# Patient Record
Sex: FEMALE | Employment: UNEMPLOYED | URBAN - METROPOLITAN AREA
[De-identification: names, ages, dates, MRNs, and addresses within clinical notes are randomized per-mention and may not be internally consistent; named-entity substitution may affect disease eponyms.]

---

## 2020-07-02 DIAGNOSIS — O09.92 HIGH-RISK PREGNANCY IN SECOND TRIMESTER: Primary | ICD-10-CM

## 2020-07-06 ENCOUNTER — INITIAL CONSULT (OUTPATIENT)
Dept: MATERNAL FETAL MEDICINE | Facility: CLINIC | Age: 35
End: 2020-07-06
Payer: OTHER GOVERNMENT

## 2020-07-06 VITALS
BODY MASS INDEX: 40.37 KG/M2 | DIASTOLIC BLOOD PRESSURE: 86 MMHG | OXYGEN SATURATION: 98 % | WEIGHT: 282 LBS | RESPIRATION RATE: 20 BRPM | HEIGHT: 70 IN | SYSTOLIC BLOOD PRESSURE: 140 MMHG | HEART RATE: 98 BPM

## 2020-07-06 DIAGNOSIS — O09.92 HIGH-RISK PREGNANCY IN SECOND TRIMESTER: ICD-10-CM

## 2020-07-06 PROCEDURE — 99204 PR OFFICE/OUTPT VISIT, NEW, LEVL IV, 45-59 MIN: ICD-10-PCS | Mod: 25,S$GLB,, | Performed by: OBSTETRICS & GYNECOLOGY

## 2020-07-06 PROCEDURE — 76811 OB US DETAILED SNGL FETUS: CPT | Mod: S$GLB,,, | Performed by: OBSTETRICS & GYNECOLOGY

## 2020-07-06 PROCEDURE — 76811 PR US, OB FETAL EVAL & EXAM, TRANSABDOM,FIRST GESTATION: ICD-10-PCS | Mod: S$GLB,,, | Performed by: OBSTETRICS & GYNECOLOGY

## 2020-07-06 PROCEDURE — 99204 OFFICE O/P NEW MOD 45 MIN: CPT | Mod: 25,S$GLB,, | Performed by: OBSTETRICS & GYNECOLOGY

## 2020-07-06 RX ORDER — PANTOPRAZOLE SODIUM 40 MG/1
TABLET, DELAYED RELEASE ORAL
COMMUNITY
Start: 2020-05-27

## 2020-07-06 RX ORDER — ACETAMINOPHEN AND CODEINE PHOSPHATE 300; 30 MG/1; MG/1
TABLET ORAL
COMMUNITY
Start: 2020-06-29

## 2020-07-06 RX ORDER — LEVOTHYROXINE SODIUM 112 UG/1
224 TABLET ORAL DAILY
COMMUNITY
Start: 2020-06-29 | End: 2020-10-15 | Stop reason: DRUGHIGH

## 2020-07-06 NOTE — LETTER
July 6, 2020        Orlin Sanon MD  [address] [facility] [address]             [place] [facility]  [address]  [place] [address]  Phone: [phone]  Fax: [phone]   Patient: Louisa Barrios   MR Number: [id number]   YOB: 1985   Date of Visit: 7/6/2020       Dear Dr. Sanon:    Thank you for referring Louisa Barrios to me for evaluation. Attached you will find relevant portions of my assessment and plan of care.    If you have questions, please do not hesitate to call me. I look forward to following Louisa Barrios along with you.    Sincerely,      Delta Daugherty MD      CC  No Recipients    Enclosure

## 2020-07-06 NOTE — PROGRESS NOTES
"Louisa is here for initial MFM consultation, referred by Dr. Talita Jr for BMI 49 and history of thyroid cancer, surgery and radiation.    She is not feeling fetal movement.    Louisa denies vaginal bleeding, loss of fluid, recurrent cramping, but does complain of occasional sharp pain across lower abdomen lasting for a few hours.  She also has occasional migraine headaches since becoming pregnant.      Vitals:    07/06/20 1035   BP: (!) 140/86   Pulse: 98   Resp: 20   SpO2: 98%   Weight: 127.9 kg (282 lb)   Height: 5' 10" (1.778 m)      BMI:                    40.46 kg/m^2               "
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
## 2020-07-28 DIAGNOSIS — O09.899 SINGLE UMBILICAL ARTERY AFFECTING MANAGEMENT OF MOTHER IN SINGLETON PREGNANCY, ANTEPARTUM: Primary | ICD-10-CM

## 2020-08-03 ENCOUNTER — PROCEDURE VISIT (OUTPATIENT)
Dept: MATERNAL FETAL MEDICINE | Facility: CLINIC | Age: 35
End: 2020-08-03
Payer: OTHER GOVERNMENT

## 2020-08-03 VITALS
HEART RATE: 90 BPM | BODY MASS INDEX: 41.95 KG/M2 | RESPIRATION RATE: 20 BRPM | SYSTOLIC BLOOD PRESSURE: 142 MMHG | OXYGEN SATURATION: 98 % | WEIGHT: 293 LBS | DIASTOLIC BLOOD PRESSURE: 86 MMHG | HEIGHT: 70 IN

## 2020-08-03 DIAGNOSIS — O09.899 SINGLE UMBILICAL ARTERY AFFECTING MANAGEMENT OF MOTHER IN SINGLETON PREGNANCY, ANTEPARTUM: ICD-10-CM

## 2020-08-03 PROCEDURE — 99215 PR OFFICE/OUTPT VISIT, EST, LEVL V, 40-54 MIN: ICD-10-PCS | Mod: 95,25,S$GLB, | Performed by: OBSTETRICS & GYNECOLOGY

## 2020-08-03 PROCEDURE — 99215 OFFICE O/P EST HI 40 MIN: CPT | Mod: 95,25,S$GLB, | Performed by: OBSTETRICS & GYNECOLOGY

## 2020-08-03 PROCEDURE — 76816 PR  US,PREGNANT UTERUS,F/U,TRANSABD APP: ICD-10-PCS | Mod: S$GLB,,, | Performed by: OBSTETRICS & GYNECOLOGY

## 2020-08-03 PROCEDURE — 76816 OB US FOLLOW-UP PER FETUS: CPT | Mod: S$GLB,,, | Performed by: OBSTETRICS & GYNECOLOGY

## 2020-08-03 NOTE — PROGRESS NOTES
Indication for follow up consultation:  History of hypothyroidism secondary to iatrogenic therapy/thyroid cancer  Maternal obesity  Fetus with single umbilical artery  New finding of gestational hypertension    Provider requesting consultation:  Dr. Talita Al    Dear Dr. Talita Al    I had the pleasure of seeing Louisa Barrios for follow up consultation today via telemedicine.  As you recall she is a 34 y.o.  at 25w1d here for follow up consultation regarding hypothyroidism and fetal single umbilical artery.    Please see my prior consultation note for complete history specifics.  In short the patient has a history of hypothyroidism secondary to therapy for thyroid cancer.  She currently has checking levothyroxine each day and has her TSH checked once per month.  According to the patient her thyroid function is doing well.    Also at our last visit the patient was noted to have a fetus with a single umbilical artery.  This appeared to be isolated.    Lastly since our last visit the patient has had some mild range hypertension at your office visits.    Review of systems: The patient denies any vaginal bleeding, loss of fluid or contraction pain today.  Vitals:    20 1014   BP: (!) 142/86   Pulse: 90   Resp: 20       Physical exam:  Gen: WD obese in NAD  HEENT: WNL  Abdomen: Soft, non-tender  Skin: No rash or jaundice  Extremities: No clubbing or cyanosis  Neuro: Grossly intact    Ultrasound:  A repeat detailed fetal anatomical survey was completed once again today.  There is a single intrauterine pregnancy in the cephalic presentation.  The estimated fetal weight is 698 grams which is the 29th percentile for this gestational age. The fetus once again is noted to have a single umbilical artery.  This appears to be isolated however the ultrasound is somewhat limited secondary to maternal obesity.  The amniotic fluid volume appears to be normal. The placenta does not show any evidence of previa.  Please see our  official report for further specifics.    Recommendations:  Today I reviewed with the patient the management of hypothyroidism in pregnancy.  I recommend to try to keep her TSH in the lower 3rd of normal range.  I would suggest checking her TSH at least once every trimester and adjusting her medications accordingly.  If her medication adjustment is needed then I would suggest repeating a TSH level in approximately 4 weeks.    We also reviewed the finding of a single umbilical artery.  Once again this appears to be isolated with no evidence of other defects.  I did  the patient that her body habitus makes her ultrasound fairly difficult yet to fortunately there does not appear to be any abnormality seen.  I would like for the patient to return here again in 1 month to reassess fetal growth and well-being.    Lastly we discussed with the patient the new finding of gestational hypertension.  I counseled the patient that I would recommend to try to keep her systolic blood pressure less than 160 and her diastolic blood pressure less than 110 mmHg.  I would not start medications at this mild range hypertension as there is the possibility of decreasing uterine perfusion and affecting fetal growth and well-being.  I counseled the patient that she may benefit from a daily low-dose aspirin and have suggested that she start aspirin 81 mg once per day.    From a follow-up standpoint, let us plan on the patient return here again in 1 month to reassess fetal growth    Thanks once again for allowing us to participate in the care of your patients.  If you have any questions about today's consultation feel free to contact me or my partners at [phone].    Sincerely,

## 2020-08-03 NOTE — PROGRESS NOTES
"Louisa is here for followup M consultation for hx of thyroid CA treated, referred by Dr. Talita Jr.    She is feeling fetal movement.    Louisa denies vaginal bleeding, loss of fluid, recurrent contractions.    She is still on Levothryroxine 224 mcg daily.    Of note her weight at her last MFM visit was recorded incorrectly as 282# when it was truly 342# She has consistently lost weight since began prenatal care at 351#.    Vitals:    07/06/20 1016 08/03/20 1014   BP:  (!) 142/86   Pulse:  90   Resp:  20   SpO2:  98%   Weight: (!) 155.1 kg (342 lb) (!) 153.3 kg (338 lb)   Height:  5' 10" (1.778 m)     BMI:                    48.5 kg/m^2     "

## 2020-08-03 NOTE — LETTER
August 3, 2020        Orlin Sanon MD  [address] [facility] [address]             [place] - [facility]etal [facility]  [address]  [place] [address]  Phone: [phone]  Fax: [phone]   Patient: Louisa Barrios   MR Number: [id number]   YOB: 1985   Date of Visit: 8/3/2020       Dear Dr. Sanon:    Thank you for referring Louisa Barrios to me for evaluation. Below are the relevant portions of my assessment and plan of care.            If you have questions, please do not hesitate to call me. I look forward to following Louisa along with you.    Sincerely,      Delta Daugherty MD      CC  No Recipients

## 2020-08-24 DIAGNOSIS — O09.899 SINGLE UMBILICAL ARTERY AFFECTING MANAGEMENT OF MOTHER IN SINGLETON PREGNANCY, ANTEPARTUM: Primary | ICD-10-CM

## 2020-08-31 ENCOUNTER — TELEPHONE (OUTPATIENT)
Dept: MATERNAL FETAL MEDICINE | Facility: CLINIC | Age: 35
End: 2020-08-31

## 2020-08-31 NOTE — TELEPHONE ENCOUNTER
Dr. Daugherty reviewed chart and recommends repeat growth US within 1-2 weeks.    Louisa was contacted and encouraged to seek care in [place] for growth US within 1-2 weeks. If unable to see a provider, go to hospital with OB services and explain need for care. She plans to go back to [facility] in about 2 weeks if possible.
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
## 2020-09-10 ENCOUNTER — TELEPHONE (OUTPATIENT)
Dept: MATERNAL FETAL MEDICINE | Facility: CLINIC | Age: 35
End: 2020-09-10

## 2020-09-10 ENCOUNTER — PROCEDURE VISIT (OUTPATIENT)
Dept: MATERNAL FETAL MEDICINE | Facility: CLINIC | Age: 35
End: 2020-09-10
Payer: OTHER GOVERNMENT

## 2020-09-10 VITALS
RESPIRATION RATE: 20 BRPM | OXYGEN SATURATION: 98 % | BODY MASS INDEX: 41.95 KG/M2 | DIASTOLIC BLOOD PRESSURE: 90 MMHG | SYSTOLIC BLOOD PRESSURE: 146 MMHG | WEIGHT: 293 LBS | HEIGHT: 70 IN | HEART RATE: 108 BPM

## 2020-09-10 DIAGNOSIS — O09.899 SINGLE UMBILICAL ARTERY AFFECTING MANAGEMENT OF MOTHER IN SINGLETON PREGNANCY, ANTEPARTUM: ICD-10-CM

## 2020-09-10 PROCEDURE — 76819 FETAL BIOPHYS PROFIL W/O NST: CPT | Mod: S$GLB,,, | Performed by: OBSTETRICS & GYNECOLOGY

## 2020-09-10 PROCEDURE — 99214 PR OFFICE/OUTPT VISIT, EST, LEVL IV, 30-39 MIN: ICD-10-PCS | Mod: 95,25,S$GLB, | Performed by: OBSTETRICS & GYNECOLOGY

## 2020-09-10 PROCEDURE — 76816 OB US FOLLOW-UP PER FETUS: CPT | Mod: S$GLB,,, | Performed by: OBSTETRICS & GYNECOLOGY

## 2020-09-10 PROCEDURE — 99214 OFFICE O/P EST MOD 30 MIN: CPT | Mod: 95,25,S$GLB, | Performed by: OBSTETRICS & GYNECOLOGY

## 2020-09-10 PROCEDURE — 76816 PR  US,PREGNANT UTERUS,F/U,TRANSABD APP: ICD-10-PCS | Mod: S$GLB,,, | Performed by: OBSTETRICS & GYNECOLOGY

## 2020-09-10 PROCEDURE — 76819 PR US, OB, FETAL BIOPHYSICAL, W/O NST: ICD-10-PCS | Mod: S$GLB,,, | Performed by: OBSTETRICS & GYNECOLOGY

## 2020-09-10 NOTE — LETTER
September 10, 2020        Orlin Sanon MD  [address] [facility] [address]             [place] - Maternal Fetal [facility]  [address]  [place] [address]  Phone: [phone]  Fax: [phone]   Patient: Louisa Barrios   MR Number: [id number]   YOB: 1985   Date of Visit: 9/10/2020       Dear Dr. Sanon:    Thank you for referring Louisa Barrios to me for evaluation. Below are the relevant portions of my assessment and plan of care.            If you have questions, please do not hesitate to call me. I look forward to following Louisa along with you.    Sincerely,      Delta Daugherty MD      [facility]

## 2020-09-10 NOTE — PROGRESS NOTES
Indication for follow up consultation:  Iatrogenic hypothyroidism secondary to history of thyroid cancer  Gestational hypertension  Fetus with a single umbilical artery    Provider requesting consultation: Dr. Sanon     Dear Dr. Sanon    I had the pleasure of seeing Louisa Barrios for follow up consultation today via telemedicine.  As you recall she is a 34 y.o.  at 30w4d here for follow up consultation regarding Iatrogenic hypothyroidism secondary to history of thyroid cancer, gestational hypertension and fetus with a single umbilical artery.    In regards to hypothyroidism, the patient is followed closely by Endocrinology who is managing her thyroid replacement medications at this time.    In regards to gestational hypertension, the patient has been noted thus far to only have mild range hypertension with no other evidence of severe disease.    Review of systems: The patient denies any vaginal bleeding, loss of fluid or contraction pain today.  She does complain of swelling of her right foot and ankle.  This swelling appears to be isolated to just her ankle and foot and does not include her calf or thigh.  Vitals:    09/10/20 1447   BP: (!) 146/90   Pulse: 108   Resp: 20   .Weight:350    Physical exam:  Gen: WDobese s in NAD  HEENT: WNL  Abdomen: Soft, non-tender on u/s exam  Skin: No rash or jaundice  Extremities: No clubbing or cyanosis  Neuro: Grossly intact    Ultrasound:  A repeat detailed fetal anatomical survey was completed once again today.  There is a single intrauterine pregnancy in the cephalic presentation.  The estimated fetal weight is 1615 grams which is the 35th percentile for this gestational age. The fetus was once again noted to have an isolated single umbilical artery.  The amniotic fluid volume appears to be normal. The placenta does not show any evidence of previa.  Please see our official report for further specifics.    Recommendations:  In regards to hypothyroidism, would defer my  recommendations to her endocrinologist regarding the management of her thyroid disease in pregnancy.  That being said well-controlled hypothyroidism is not an indication for early delivery.    This I also discussed with the patient the management of hypertension in pregnancy.  As long as her blood pressure is less than 160 mmHg systolic and less than 110 mmhg blood pressure diastolic then I do not see any indication for now for antihypertensive medication.  I would however recommend to start some form of  testing beginning at 32 weeks and continuing until delivery.  I would also consider moving towards delivery at 37-39 weeks as long as she does not show any evidence of severe disease prior to that time.    In regards to the finding of a single umbilical artery, once again this continues to appear isolated.  I would like for her to return here for 1 final assessment of fetal growth in approximately 4-5 weeks.  As long as the interval fetal growth is reassuring then an isolated single umbilical artery is not an indication for an early delivery.    Thanks once again for allowing us to participate in the care of your patients.  If you have any questions about today's consultation feel free to contact me or my partners at [phone].    Sincerely,

## 2020-09-10 NOTE — PROGRESS NOTES
"Louisa is here for followup [facility] consultation for 2 vessel cord, gestational HTN, and hx of thyroid cancer referred by Dr. Talita Jr and BETTY.    She is feeling fetal movement.    Louisa denies vaginal bleeding, loss of fluid, recurrent contractions, headaches, visual, or epigastric complaints. She dose have +2 edema in both feet and states that the R one gets worse and painful.    Her Levothyroxine was increased to 250 mcg daily by her endocrinologist; he thinks her thyroid cancer may be back.    She is still smoking 2-3 cigarettes per day.    Vitals:    09/10/20 1447   BP: (!) 146/90   Pulse: 108   Resp: 20   SpO2: 98%   Weight: (!) 158.8 kg (350 lb)   Height: 5' 10" (1.778 m)     BMI:                    50.22 kg/m^2   "

## 2020-09-10 NOTE — TELEPHONE ENCOUNTER
Pt states BETTY won't do her growth scan as Dr. Daugherty requested on 8/31/20; states she has tried 2 times. Pt is able to drive to [place] today and will come for [facility] by 1415.

## 2020-09-28 DIAGNOSIS — O09.899 SINGLE UMBILICAL ARTERY AFFECTING MANAGEMENT OF MOTHER IN SINGLETON PREGNANCY, ANTEPARTUM: Primary | ICD-10-CM

## 2020-10-15 ENCOUNTER — PROCEDURE VISIT (OUTPATIENT)
Dept: MATERNAL FETAL MEDICINE | Facility: CLINIC | Age: 35
End: 2020-10-15
Payer: OTHER GOVERNMENT

## 2020-10-15 VITALS
HEART RATE: 105 BPM | OXYGEN SATURATION: 98 % | WEIGHT: 293 LBS | HEIGHT: 70 IN | RESPIRATION RATE: 20 BRPM | SYSTOLIC BLOOD PRESSURE: 136 MMHG | BODY MASS INDEX: 41.95 KG/M2 | DIASTOLIC BLOOD PRESSURE: 86 MMHG

## 2020-10-15 DIAGNOSIS — O09.899 SINGLE UMBILICAL ARTERY AFFECTING MANAGEMENT OF MOTHER IN SINGLETON PREGNANCY, ANTEPARTUM: ICD-10-CM

## 2020-10-15 PROCEDURE — 99213 OFFICE O/P EST LOW 20 MIN: CPT | Mod: 25,S$GLB,, | Performed by: OBSTETRICS & GYNECOLOGY

## 2020-10-15 PROCEDURE — 76816 OB US FOLLOW-UP PER FETUS: CPT | Mod: S$GLB,,, | Performed by: OBSTETRICS & GYNECOLOGY

## 2020-10-15 PROCEDURE — 76819 FETAL BIOPHYS PROFIL W/O NST: CPT | Mod: S$GLB,,, | Performed by: OBSTETRICS & GYNECOLOGY

## 2020-10-15 PROCEDURE — 76819 PR US, OB, FETAL BIOPHYSICAL, W/O NST: ICD-10-PCS | Mod: S$GLB,,, | Performed by: OBSTETRICS & GYNECOLOGY

## 2020-10-15 PROCEDURE — 99213 PR OFFICE/OUTPT VISIT, EST, LEVL III, 20-29 MIN: ICD-10-PCS | Mod: 25,S$GLB,, | Performed by: OBSTETRICS & GYNECOLOGY

## 2020-10-15 PROCEDURE — 76816 PR  US,PREGNANT UTERUS,F/U,TRANSABD APP: ICD-10-PCS | Mod: S$GLB,,, | Performed by: OBSTETRICS & GYNECOLOGY

## 2020-10-15 RX ORDER — LEVOTHYROXINE SODIUM 125 UG/1
250 TABLET ORAL
COMMUNITY
Start: 2020-08-05 | End: 2021-08-05

## 2020-10-15 NOTE — PROGRESS NOTES
Indication for follow up consultation:  Fetal single umbilical artery  History of thyroid cancer with surgical excision  Gestational hypertension    Provider requesting consultation:  Dr. Sanon    Dear Dr. Sanon,    I had the pleasure of seeing Louisa Barrios for follow up consultation today.  As you recall she is a 35 y.o.  at 35w4d here for follow up consultation regarding fetal single umbilical artery and gestational hypertension.    The patient also has a history of thyroid cancer with surgical excision of her thyroid.  This is lead to a hypothyroid state for which she currently takes levothyroxine 250 micro g each day.    The patient also is notable to have mild range hypertension.  She is not requiring any medications for blood pressure control.  Her blood pressure an our office today is 136/86.    Review of systems: The patient denies any vaginal bleeding, loss of fluid or contraction pain today.  Vitals:    10/15/20 1049   BP: 136/86   Pulse: 105   Resp: 20       Physical exam:  Gen: WDWN in NAD  HEENT: WNL  Abdomen: Soft, non-tender  Skin: No rash or jaundice  Extremities: No clubbing or cyanosis  Neuro: Grossly intact    Ultrasound:  A repeat detailed fetal anatomical survey was completed once again today.  There is a single intrauterine pregnancy in the cephalic presentation.  The estimated fetal weight is 2366 grams which is the 14th percentile for this gestational age. The fetus did not show any overt evidence of structure abnormalities.  The amniotic fluid volume appears to be normal. The placenta does not show any evidence of previa.  BPP 8.  Please see our official report for further specifics.    Recommendations:  Today I reviewed with the patient the above ultrasound findings.  I also reviewed with the patient the management of hypertension in pregnancy.  Based off of the above ultrasound findings along with her previous exams noting mild range hypertension would be planning for delivery  somewhere between 37 and 39 weeks.  Personally I would recommend 37-38 weeks if her cervix is favorable if not favorable then 38-39 weeks.  I would not recommend proceeding past her due date.  Between now and delivery I would recommend some form of  testing with either once weekly biophysical profile or twice weekly nonstress testing.  I would recommend delivery immediately if you begin to see any evidence of severe range hypertension or fetal compromise.    From a hypothyroidism standpoint, I would recommend continued thyroid medication replacement with levothyroxine as outlined above.      Thanks once again for allowing us to participate in the care of your patients.  If you have any questions about today's consultation feel free to contact me or my partners at [phone].    Sincerely,

## 2020-10-15 NOTE — LETTER
October 15, 2020        [facility]  [address] [facility]  [place] [address]  Phone: [phone]  Fax: [phone]   Patient: Louisa Barrios   MR Number: [id number]   YOB: 1985   Date of Visit: 10/15/2020       Dear Dr. Lani Huang:    Thank you for referring Louisa Barrios to me for evaluation. Below are the relevant portions of my assessment and plan of care.            If you have questions, please do not hesitate to call me. I look forward to following Louisa along with you.    Sincerely,      Delta Daugherty MD      [facility]

## 2020-10-15 NOTE — PROGRESS NOTES
"Louisa is here for followup [facility] consultation for 2 vessel cord, hypertension in pregnancy, and history of thyroid cancer referred by Dr. Talita Jr.    The patient is not taking medication for blood pressure control.  She is not taking a daily low dose aspirin currently.  Her Levothyroxine dose is 250 mcg daily.    She is feeling fetal movement, and is doing NST twice a week.    Louisa denies vaginal bleeding, loss of fluid, recurrent contractions.    The patient denies headache, visual changes or right upper quadrant pain.    She does still smoke 3 cigarettes per day.    Vitals:    10/15/20 1049   BP: 136/86   Pulse: 105   Resp: 20   SpO2: 98%   Weight: (!) 160.1 kg (353 lb)   Height: 5' 10" (1.778 m)     BMI:                    50.65 kg/m^2               "

## 2024-03-13 DIAGNOSIS — C73 THYROID CANCER: Primary | ICD-10-CM

## 2024-03-13 DIAGNOSIS — C77.9 LYMPH NODE CANCER: ICD-10-CM

## 2024-03-19 ENCOUNTER — TELEPHONE (OUTPATIENT)
Dept: GASTROENTEROLOGY | Facility: CLINIC | Age: 39
End: 2024-03-19
Payer: OTHER GOVERNMENT

## 2024-03-20 NOTE — TELEPHONE ENCOUNTER
"Need complete referral. The one sent is one page and ends at "Recent Ct scan states". We need the last office notes, any lab results and the CT report.  NBP  "

## 2024-04-05 ENCOUNTER — TELEPHONE (OUTPATIENT)
Dept: GASTROENTEROLOGY | Facility: CLINIC | Age: 39
End: 2024-04-05
Payer: OTHER GOVERNMENT

## 2024-04-05 NOTE — TELEPHONE ENCOUNTER
Referral reviewed. May need updated CT and eval for EGD/colonoscopy.    Okay to schedule next available OV with NP.  NBP

## 2024-04-11 NOTE — PROGRESS NOTES
Clinic Note    Reason for visit:  The primary encounter diagnosis was Abnormal finding on GI tract imaging. Diagnoses of Change in bowel habits, Sclerosing mesenteritis, Epigastric pain, Diarrhea, unspecified type, Gastroesophageal reflux disease, unspecified whether esophagitis present, Bloating, Early satiety, Incontinence of feces, unspecified fecal incontinence type, Thyroid cancer, Lymph node cancer, History of pancreatitis, and BMI 50.0-59.9, adult were also pertinent to this visit.    PCP: Ruben Villa   No address on file    HPI:  This is a 38 y.o. female here to be established. Around 18 months ago she has been having increase in diarrhea. She will go anywhere form 5-10 times daily. She has severe fecal urgency with fecal incontinence at least two times a week. Rarely has formed BM. She is on pantoprazole 40mg every morning for reflux. States often has to take a second dose due to continued reflux. Symptoms are always worse at night. She has severe bloating for past 6 months. Has severe belching. Often belches up foul smell. Describes as rotten eggs. Does have early satiety. Occasionally has N/V.    2/6/2024 CBC, CMP- nl  10/2/2023 TSH-nl T4 10.6H, A1C 6.8H%    7/23/2023 CT A/P W/: Increased number of central mesenteric and retroperitoneal lymph nodes. Mild infl of mesenteric fat.mild to moderate diffuse colonic stool. Normal liver, GP, spleen, Pancreas    She has H/O Thyroid cancer 2012 s/p thyroidectomy. Then had reoccurrence with positive lymph nodes. She has been in remission. Has had her synthroid increased twice this past year due to abnormal thyroid level.    She has had h/o of pancreatitis s/p hospitalization about 8 years ago. States she is unsure of reasoning of pancreatitis. No ETOH us.     Review of Systems   Constitutional:  Negative for fatigue, fever and unexpected weight change.   HENT:  Negative for mouth sores, postnasal drip, sore throat and trouble swallowing.    Eyes:  Negative for  pain, discharge and eye dryness.   Respiratory:  Positive for apnea and chest tightness. Negative for cough, choking, shortness of breath and wheezing.    Cardiovascular:  Negative for chest pain, palpitations and leg swelling.   Gastrointestinal:  Positive for abdominal pain, change in bowel habit, diarrhea, reflux and fecal incontinence. Negative for abdominal distention, anal bleeding, blood in stool, constipation, nausea, rectal pain and vomiting.   Genitourinary:  Negative for bladder incontinence, dysuria and hematuria.   Musculoskeletal:  Negative for arthralgias, back pain and joint swelling.   Integumentary:  Negative for color change and rash.   Allergic/Immunologic: Negative for environmental allergies and food allergies.   Neurological:  Negative for seizures and headaches.   Hematological:  Negative for adenopathy. Does not bruise/bleed easily.        Past Medical History:   Diagnosis Date    BMI 50.0-59.9, adult     GERD (gastroesophageal reflux disease)     History of pancreatitis     HLD (hyperlipidemia)     Hypertension     Pulmonary hypertension     Sleep apnea, unspecified     moderate. appt today for CPAP    Thyroid cancer 2012, 2014    treated with surgery and radiation    Type 2 diabetes mellitus without complications 10/2023     Past Surgical History:   Procedure Laterality Date    ADENOIDECTOMY      TOTAL THYROIDECTOMY  2012     Family History   Problem Relation Age of Onset    Kidney cancer Mother         kidney, uterine, and leukemia    Skin cancer Father         skin    Kidney cancer Maternal Grandfather         kidney    Kidney cancer Paternal Grandmother         uterine and kidney     Social History     Tobacco Use    Smoking status: Former     Types: Cigarettes    Tobacco comments:     down to 1 cigarette per morning   Substance Use Topics    Alcohol use: Not Currently    Drug use: Never     Review of patient's allergies indicates:  No Known Allergies   Medication List with  "Changes/Refills   New Medications    FAMOTIDINE (PEPCID) 40 MG TABLET    Take 1 tablet (40 mg total) by mouth every evening.    SOD SULF-POT CHLORIDE-MAG SULF (SUTAB) 1.479-0.188- 0.225 GRAM TABLET    Take according to package instructions with indicated amount of water. No breakfast day before test. May substitute with Suprep, Clenpiq, Plenvu, Moviprep or GoLytely based on Rx plan and patient preference.   Current Medications    FREESTYLE LITE STRIPS STRP        LEVOTHYROXINE (SYNTHROID) 200 MCG TABLET    Take 200 mcg by mouth before breakfast. Take with the 75 mcg    LEVOTHYROXINE (SYNTHROID) 75 MCG TABLET    Take 75 mcg by mouth before breakfast. Take with the 200 mcg    LIPITOR 20 MG TABLET    Take 20 mg by mouth every evening.    LISINOPRIL-HYDROCHLOROTHIAZIDE (PRINZIDE,ZESTORETIC) 20-12.5 MG PER TABLET    Take 1 tablet by mouth once daily.    METFORMIN (GLUCOPHAGE) 1000 MG TABLET    Take 1,000 mg by mouth daily with breakfast.    METOPROLOL SUCCINATE (TOPROL-XL) 25 MG 24 HR TABLET    Take 25 mg by mouth once daily.    OZEMPIC 1 MG/DOSE (4 MG/3 ML)    Inject 1 mg into the skin every 7 days.    PANTOPRAZOLE (PROTONIX) 40 MG TABLET       Discontinued Medications    ACETAMINOPHEN-CODEINE 300-30MG (TYLENOL #3) 300-30 MG TAB    Prn migraine headaches during pregnancy    LEVOTHYROXINE (SYNTHROID) 125 MCG TABLET    Take 250 mcg by mouth.          Vital Signs:  BP (!) 157/95 (BP Location: Left arm, Patient Position: Sitting, BP Method: Large (Automatic)) Comment: left forearm  Pulse 95   Resp 18   Ht 5' 10" (1.778 m)   Wt (!) 176.2 kg (388 lb 6.4 oz)   SpO2 97%   BMI 55.73 kg/m²        Physical Exam  Vitals reviewed.   Constitutional:       General: She is awake. She is not in acute distress.     Appearance: Normal appearance. She is well-developed. She is obese. She is not ill-appearing, toxic-appearing or diaphoretic.   HENT:      Head: Normocephalic and atraumatic.      Nose: Nose normal.      Mouth/Throat:    "   Mouth: Mucous membranes are moist.      Pharynx: Oropharynx is clear. No oropharyngeal exudate or posterior oropharyngeal erythema.   Eyes:      General: Lids are normal. Gaze aligned appropriately. No scleral icterus.        Right eye: No discharge.         Left eye: No discharge.      Conjunctiva/sclera: Conjunctivae normal.   Neck:      Trachea: Trachea normal.   Cardiovascular:      Rate and Rhythm: Normal rate and regular rhythm.      Pulses:           Radial pulses are 2+ on the right side and 2+ on the left side.   Pulmonary:      Effort: Pulmonary effort is normal. No respiratory distress.      Breath sounds: No stridor. No wheezing.   Chest:      Chest wall: No tenderness.   Abdominal:      General: Bowel sounds are normal. There is no distension.      Palpations: Abdomen is soft. There is no fluid wave, hepatomegaly or mass.      Tenderness: There is no abdominal tenderness. There is no guarding or rebound.   Musculoskeletal:         General: No tenderness or deformity.      Cervical back: Full passive range of motion without pain and neck supple. No tenderness.      Right lower leg: No edema.      Left lower leg: No edema.   Lymphadenopathy:      Cervical: No cervical adenopathy.   Skin:     General: Skin is warm and dry.      Capillary Refill: Capillary refill takes less than 2 seconds.      Coloration: Skin is not cyanotic, jaundiced or pale.   Neurological:      General: No focal deficit present.      Mental Status: She is alert and oriented to person, place, and time.      Motor: No tremor.   Psychiatric:         Attention and Perception: Attention normal.         Mood and Affect: Mood and affect normal.         Speech: Speech normal.         Behavior: Behavior normal. Behavior is cooperative.            All of the data above and below has been reviewed by myself and any further interpretations will be reflected in the assessment and plan.   The data includes review of external notes, and  independent interpretation of lab results, procedures, x-rays, and imaging reports.      Assessment:  Abnormal finding on GI tract imaging  -     Ambulatory Referral to External Surgery  -     CT Abdomen Pelvis With IV Contrast Routine Oral Contrast; Future; Expected date: 04/12/2024    Change in bowel habits  -     sod sulf-pot chloride-mag sulf (SUTAB) 1.479-0.188- 0.225 gram tablet; Take according to package instructions with indicated amount of water. No breakfast day before test. May substitute with Suprep, Clenpiq, Plenvu, Moviprep or GoLytely based on Rx plan and patient preference.  Dispense: 24 tablet; Refill: 0    Sclerosing mesenteritis  -     CT Abdomen Pelvis With IV Contrast Routine Oral Contrast; Future; Expected date: 04/12/2024    Epigastric pain  -     CT Abdomen Pelvis With IV Contrast Routine Oral Contrast; Future; Expected date: 04/12/2024  -     NM Gastric Emptying; Future; Expected date: 04/12/2024    Diarrhea, unspecified type  -     Ambulatory Referral to External Surgery  -     Calprotectin, Stool; Future; Expected date: 04/12/2024  -     Fecal fat, qualitative; Future; Expected date: 04/12/2024  -     Pancreatic elastase, fecal; Future; Expected date: 04/12/2024  -     Giardia / Cryptosporidum, EIA; Future; Expected date: 04/12/2024  -     Clostridium diff Toxin/GDH w/ reflex PCR; Future; Expected date: 04/12/2024    Gastroesophageal reflux disease, unspecified whether esophagitis present  -     Ambulatory Referral to External Surgery  -     famotidine (PEPCID) 40 MG tablet; Take 1 tablet (40 mg total) by mouth every evening.  Dispense: 30 tablet; Refill: 11    Bloating  -     NM Gastric Emptying; Future; Expected date: 04/12/2024    Early satiety  -     NM Gastric Emptying; Future; Expected date: 04/12/2024    Incontinence of feces, unspecified fecal incontinence type    Thyroid cancer  -     Ambulatory referral/consult to Gastroenterology    Lymph node cancer  -     Ambulatory  referral/consult to Gastroenterology    History of pancreatitis    BMI 50.0-59.9, adult    Plan on E/C w/ G/D/RCBx r/o PUD, H.pylori, gastritis, celiac, colitis/IBD.  Stool tests to r/o infection, malabsorption, EPI, infl  Will add famotidine to take at night with pantoprazole in the AM.   GES t/ ro gastroparesis.  CT 7/2023 w/ possible mesenteritis. Will repeat imaging.     Recommendations:    Schedule upper and lower endoscopy with Dr. Narayanan 7/3/2024  Stop Ozempic week before procedure.  Schedule CT scan  Schedule gastric emptying scan  Submit stool tests.  Start Famotidine 40mg every evening.       Risks, benefits, and alternatives of medical management, any associated procedures, and/or treatment discussed with the patient. Patient given opportunity to ask questions and voices understanding. Patient has elected to proceed with the recommended care modalities as discussed.    Follow up in about 3 months (around 7/12/2024).    Order summary:  Orders Placed This Encounter   Procedures    Clostridium diff Toxin/GDH w/ reflex PCR    CT Abdomen Pelvis With IV Contrast Routine Oral Contrast    NM Gastric Emptying    Calprotectin, Stool    Fecal fat, qualitative    Pancreatic elastase, fecal    Giardia / Cryptosporidum, EIA    Ambulatory Referral to External Surgery        Instructed patient to notify my office if they have not been contacted within two weeks after any procedures, submitting any samples (biopsies, blood, stool, urine, etc.) or after any imaging (X-ray, CT, MRI, etc.).      Sadie Sheldon NP    This document may have been created using a voice recognition transcribing system. Incorrect words or phrases may have been missed during proofreading. Please interpret accordingly or contact me for clarification.

## 2024-04-12 ENCOUNTER — TELEPHONE (OUTPATIENT)
Dept: GASTROENTEROLOGY | Facility: CLINIC | Age: 39
End: 2024-04-12

## 2024-04-12 ENCOUNTER — OFFICE VISIT (OUTPATIENT)
Dept: GASTROENTEROLOGY | Facility: CLINIC | Age: 39
End: 2024-04-12
Payer: OTHER GOVERNMENT

## 2024-04-12 VITALS
HEART RATE: 95 BPM | WEIGHT: 293 LBS | BODY MASS INDEX: 41.95 KG/M2 | DIASTOLIC BLOOD PRESSURE: 95 MMHG | HEIGHT: 70 IN | OXYGEN SATURATION: 97 % | RESPIRATION RATE: 18 BRPM | SYSTOLIC BLOOD PRESSURE: 157 MMHG

## 2024-04-12 DIAGNOSIS — R68.81 EARLY SATIETY: ICD-10-CM

## 2024-04-12 DIAGNOSIS — R15.9 INCONTINENCE OF FECES, UNSPECIFIED FECAL INCONTINENCE TYPE: ICD-10-CM

## 2024-04-12 DIAGNOSIS — R19.7 DIARRHEA, UNSPECIFIED TYPE: ICD-10-CM

## 2024-04-12 DIAGNOSIS — K21.9 GASTROESOPHAGEAL REFLUX DISEASE, UNSPECIFIED WHETHER ESOPHAGITIS PRESENT: ICD-10-CM

## 2024-04-12 DIAGNOSIS — R10.13 EPIGASTRIC PAIN: ICD-10-CM

## 2024-04-12 DIAGNOSIS — R14.0 BLOATING: ICD-10-CM

## 2024-04-12 DIAGNOSIS — R93.3 ABNORMAL FINDING ON GI TRACT IMAGING: Primary | ICD-10-CM

## 2024-04-12 DIAGNOSIS — Z87.19 HISTORY OF PANCREATITIS: ICD-10-CM

## 2024-04-12 DIAGNOSIS — K65.4 SCLEROSING MESENTERITIS: ICD-10-CM

## 2024-04-12 DIAGNOSIS — R19.4 CHANGE IN BOWEL HABITS: ICD-10-CM

## 2024-04-12 DIAGNOSIS — C77.9 LYMPH NODE CANCER: ICD-10-CM

## 2024-04-12 DIAGNOSIS — C73 THYROID CANCER: ICD-10-CM

## 2024-04-12 PROCEDURE — 99215 OFFICE O/P EST HI 40 MIN: CPT | Mod: S$GLB,,, | Performed by: NURSE PRACTITIONER

## 2024-04-12 RX ORDER — FAMOTIDINE 40 MG/1
40 TABLET, FILM COATED ORAL NIGHTLY
Qty: 30 TABLET | Refills: 11 | Status: SHIPPED | OUTPATIENT
Start: 2024-04-12 | End: 2025-04-12

## 2024-04-12 RX ORDER — ATORVASTATIN CALCIUM TRIHYDRATE 20 MG/1
20 TABLET, FILM COATED ORAL NIGHTLY
COMMUNITY
Start: 2023-05-18

## 2024-04-12 RX ORDER — LEVOTHYROXINE SODIUM 200 UG/1
200 TABLET ORAL
COMMUNITY

## 2024-04-12 RX ORDER — BLOOD-GLUCOSE METER
KIT MISCELLANEOUS
COMMUNITY
Start: 2024-02-06

## 2024-04-12 RX ORDER — METFORMIN HYDROCHLORIDE 1000 MG/1
1000 TABLET ORAL
COMMUNITY

## 2024-04-12 RX ORDER — LISINOPRIL AND HYDROCHLOROTHIAZIDE 12.5; 2 MG/1; MG/1
1 TABLET ORAL DAILY
COMMUNITY
Start: 2023-10-02 | End: 2024-10-01

## 2024-04-12 RX ORDER — LEVOTHYROXINE SODIUM 75 UG/1
75 TABLET ORAL
COMMUNITY

## 2024-04-12 RX ORDER — SEMAGLUTIDE 1.34 MG/ML
1 INJECTION, SOLUTION SUBCUTANEOUS
COMMUNITY
Start: 2024-02-06

## 2024-04-12 RX ORDER — SOD SULF/POT CHLORIDE/MAG SULF 1.479 G
TABLET ORAL
Qty: 24 TABLET | Refills: 0 | Status: SHIPPED | OUTPATIENT
Start: 2024-04-12

## 2024-04-12 RX ORDER — METOPROLOL SUCCINATE 25 MG/1
25 TABLET, EXTENDED RELEASE ORAL DAILY
COMMUNITY
Start: 2024-03-19

## 2024-04-12 NOTE — PATIENT INSTRUCTIONS
Schedule upper and lower endoscopy with Dr. Narayanan 7/3/2024  Stop Ozempic week before procedure  Schedule CT scan  Schedule gastric emptying scan  Submit stool tests.  Start Famotidine 40mg daily.     Please notify my office if you have not been contacted within two weeks after any procedures, submitting any samples (biopsies, blood, stool, urine, etc.) or after any imaging (X-ray, CT, MRI, etc.).

## 2024-04-12 NOTE — TELEPHONE ENCOUNTER
Reviewed instructions and gave patient instructions. Patient was also given sutab coupon. Patient was given AVS for apt date and time. Patient was also given stool order for stool test. Patients orders for GES, CT SCAN, and EGD/COLONOSCOPY ORDER sent to central scheduling at [facility]. 4/12/24 LRA

## 2024-04-22 LAB
ADDITIONAL TESTING: NORMAL
APPEARANCE SNV: NORMAL
C DIFF TOXIN: NEGATIVE
CRYPTOSPORIDIUM DNA: NOT DETECTED
GIARDIA LAMBLIA DNA: NOT DETECTED

## 2024-04-23 ENCOUNTER — TELEPHONE (OUTPATIENT)
Dept: GASTROENTEROLOGY | Facility: CLINIC | Age: 39
End: 2024-04-23
Payer: OTHER GOVERNMENT

## 2024-04-23 NOTE — TELEPHONE ENCOUNTER
----- Message from Gianni Hong sent at 4/23/2024  9:37 AM CDT -----  Contact: self  Patient Louisa Barrios is requesting a call back regarding colonoscopy/ GI scope scheduled for July 3rd and possibly getting in sooner. Please call back at [phone]

## 2024-04-24 NOTE — PROGRESS NOTES
4/24/2024 CT A/P w/: multiple RLL nodules. Largest measuring 7mm. Multiple LLL w/ largest measuring 6mm. Fatty liver, small HH, mild enlarged retroperitoneal lymph nodes

## 2024-04-24 NOTE — TELEPHONE ENCOUNTER
4/24/2024 CT A/P w/: multiple RLL nodules. Largest measuring 7mm. Multiple LLL w/ largest measuring 6mm. Fatty liver, small HH, mild enlarged retroperitoneal lymph nodes    Spoke with patient. Patient has known lung nodules. She states she has yearly chest Cts to monitor and they have been stable. GES schedule end of May. Will call her with stool tests results.  JAH

## 2024-04-25 ENCOUNTER — PATIENT MESSAGE (OUTPATIENT)
Dept: GASTROENTEROLOGY | Facility: CLINIC | Age: 39
End: 2024-04-25
Payer: OTHER GOVERNMENT

## 2024-04-25 ENCOUNTER — TELEPHONE (OUTPATIENT)
Dept: GASTROENTEROLOGY | Facility: CLINIC | Age: 39
End: 2024-04-25
Payer: OTHER GOVERNMENT

## 2024-04-25 NOTE — TELEPHONE ENCOUNTER
----- Message from Joy Narayanan MD sent at 4/25/2024  3:54 AM CDT -----  Regarding: RE: sooner procedure  Thank you, Sadie.    Nany, let patient know we are awaiting the finalization of her results to review. Does she have her CT scheduled? Provide scheduling's number and resend order if not.  NBP  ----- Message -----  From: Sadie Sheldon FNP  Sent: 4/23/2024   3:33 PM CDT  To: Marhta Shankar MA; Joy Narayanan MD  Subject: RE: sooner procedure                             We have not moved her date up yet. (Dr. Narayanan I do not think I sent this to you- Patient did not want to wait until July). I am waiting for stool tests and CT scan. I had discussed with her completing those first and if everything is normal we may move up procedure. I see she had stool tests done I am just waiting for all the tests to finalize.  ----- Message -----  From: Martha Shankar MA  Sent: 4/23/2024  12:16 PM CDT  To: Joy Narayanan MD; STEPHENIE Jones  Subject: sooner procedure                                 Pt called wanting to know if Sadie had spoken to Dr. Narayanan about her having her procedure done sooner than July.

## 2024-04-29 ENCOUNTER — TELEPHONE (OUTPATIENT)
Dept: GASTROENTEROLOGY | Facility: CLINIC | Age: 39
End: 2024-04-29
Payer: OTHER GOVERNMENT

## 2024-04-29 DIAGNOSIS — K58.0 IRRITABLE BOWEL SYNDROME WITH DIARRHEA: Primary | ICD-10-CM

## 2024-04-29 NOTE — TELEPHONE ENCOUNTER
Stool test results imported into patient's chart from [facility]. Results faxed to PCP at [phone].    Patient was notified of results and voiced understanding. She agreed to move procedure to 6/17/2024. This was put into the GI scheduling thread in Teams for someone to update epic schedule.     Patient agreed to try Xifaxan and requested that it be sent to [facility] pharmacy which I believe is already in her chart. DMP

## 2024-04-29 NOTE — TELEPHONE ENCOUNTER
4/19/2024 C.diff, parasite- neg. Elastase, calpro- fat- NL.    Remainder of stool tests will need to be scanned in from [facility].    Call with results. Her stool tests were overall negative. Her stool tests were negative for infection, inflammation, malabsorption and exocrine pancreatic insufficiency. I know she wants a sooner date for her colonoscopy. Ok to move her procedures to Monday June 17, 2024. We can also put her on cancellation list if any date sooner than that opens up (She may be already on list).    In the meantime, we can try a course of Xifaxan to see if her diarrhea improves. It is a 14 day course. Let me know if she wants to try and will send to her pharmacy.  Dorinda

## 2024-06-10 ENCOUNTER — TELEPHONE (OUTPATIENT)
Dept: GASTROENTEROLOGY | Facility: CLINIC | Age: 39
End: 2024-06-10
Payer: OTHER GOVERNMENT

## 2024-06-10 NOTE — TELEPHONE ENCOUNTER
----- Message from Denisa Watts sent at 6/10/2024  9:14 AM CDT -----  Contact: self  Patient is requesting a call back regarding rescheduling upcoming colonoscopy. Please call back at [phone]

## 2024-06-10 NOTE — TELEPHONE ENCOUNTER
Returned pt call. Pt stated she is going out of town for a  and needs to r/s her procedure. I r/s to [facility] next avail 10/29/24 - Tues. Pt has requested to be added to the wait list and would like to be scheduled on a MON, TUES, or WED.     Pt has  and expires on 24. Send message to Carrie that we will need a new PA. karla

## 2024-09-25 ENCOUNTER — TELEPHONE (OUTPATIENT)
Dept: GASTROENTEROLOGY | Facility: CLINIC | Age: 39
End: 2024-09-25

## 2024-09-25 NOTE — TELEPHONE ENCOUNTER
----- Message from Winter Jc sent at 9/25/2024  9:00 AM CDT -----  Regarding: Sooner Appointment  Contact: patient  Type:  Sooner Apoointment Request    Caller is requesting a sooner appointment.  Caller declined first available appointment listed below.  Caller will not accept being placed on the waitlist and is requesting a message be sent to doctor.  Name of Caller:Louisa   When is the first available appointment? 10/29/2024  Symptoms:colonoscopy and upper GI  Would the patient rather a call back or a response via [facility]?  Call back   Best Call Back Number: [phone] (home)     Additional Information: the caller stated that other medical issue has occurred.    Thanks,  KELIN

## 2024-09-25 NOTE — TELEPHONE ENCOUNTER
Called and spoke with patient. She advised she's been to the hospital and received IV magnesium because she's no longer absorbing it. The hospital thinks its GI related. Patient also has been having severe diarrhea and it is getting worse. She is wanting to know if there's any way she can have her procedures sooner? I did advise patient that [facility] is already overbooked but will ask and see what [facility] and nurse practitioners say. DMP

## 2024-09-30 NOTE — TELEPHONE ENCOUNTER
Will need to see what hospital she was admitted.  Recent bloodwork magnesium levels were WNL (likely because she received IV prior?).  Will have to defer to [facility] if can have procedure sooner.    We gave her course of Xifaxan 4/2024 for diarrhea. Did this improve her symptoms at that time? Can send out a another course in the meantime if it did.  KN

## 2024-10-01 NOTE — TELEPHONE ENCOUNTER
Called and spoke with patient. She said she was admitted to [facility] in [place], [place]. She received 6 units of magnesium. Yes, she'd like for us to check with [facility] as far as having a procedure sooner. Records request faxed to [facility] at [phone]. Patient advised the Xifaxan course did not improve her symptoms so she doesn't want that called out. Patient said her symptoms are still the same. Diarrhea 2-3 times daily. She's now starting to have nausea at nighttime. Patient said she's currently taking 800 mg of magnesium and it's unknown if that is helping her symptoms. I asked patient if she's tried any anti-diarrheal medications and patient said she's only tried imodium which has not helped. She has not tried anything new. She said her doctor won't prescribe anything because he wants [facility] to decide on treatment. Patient said she was also admitted to [facility] about 4-5 weeks ago and they also had to give her 6 units of magnesium. Records request also faxed to [facility] at [phone]. DMP

## 2024-10-22 ENCOUNTER — TELEPHONE (OUTPATIENT)
Dept: GASTROENTEROLOGY | Facility: CLINIC | Age: 39
End: 2024-10-22
Payer: OTHER GOVERNMENT

## 2024-10-22 VITALS — HEIGHT: 70 IN | WEIGHT: 293 LBS | BODY MASS INDEX: 41.95 KG/M2

## 2024-10-22 DIAGNOSIS — K21.9 GASTROESOPHAGEAL REFLUX DISEASE, UNSPECIFIED WHETHER ESOPHAGITIS PRESENT: ICD-10-CM

## 2024-10-22 DIAGNOSIS — R19.7 DIARRHEA, UNSPECIFIED TYPE: ICD-10-CM

## 2024-10-22 DIAGNOSIS — R93.3 ABNORMAL FINDING ON GI TRACT IMAGING: Primary | ICD-10-CM

## 2024-10-22 NOTE — TELEPHONE ENCOUNTER
"[facility] [facility]  [address] [place] [address]  Phone: [phone]  Fax: [phone]    History & Physical         Provider: Dr. Joy Narayanan    Patient Name: Louisa EVANGELISTA (age):1985  39 y.o.           Gender: female   Phone: [phone]     Referring Physician: Ruben Villa     Vital Signs:   Height - 5' 10"  Weight - 388 lb  BMI -  55.67    Plan: EGD /w G/Dbx/Colonoscopy @ [facility]    Encounter Diagnoses   Name Primary?    Abnormal finding on GI tract imaging Yes    Diarrhea, unspecified type     Gastroesophageal reflux disease, unspecified whether esophagitis present            History:      Past Medical History:   Diagnosis Date    BMI 50.0-59.9, adult     GERD (gastroesophageal reflux disease)     History of pancreatitis     HLD (hyperlipidemia)     Hypertension     Pulmonary hypertension     Sleep apnea, unspecified     moderate. appt today for CPAP    Thyroid cancer ,     treated with surgery and radiation    Type 2 diabetes mellitus without complications 10/2023      Past Surgical History:   Procedure Laterality Date    ADENOIDECTOMY      TOTAL THYROIDECTOMY        Medication List with Changes/Refills   Current Medications    FAMOTIDINE (PEPCID) 40 MG TABLET    Take 1 tablet (40 mg total) by mouth every evening.    FREESTYLE LITE STRIPS STRP        LEVOTHYROXINE (SYNTHROID) 200 MCG TABLET    Take 200 mcg by mouth before breakfast. Take with the 75 mcg    LEVOTHYROXINE (SYNTHROID) 75 MCG TABLET    Take 75 mcg by mouth before breakfast. Take with the 200 mcg    LIPITOR 20 MG TABLET    Take 20 mg by mouth every evening.    LISINOPRIL-HYDROCHLOROTHIAZIDE (PRINZIDE,ZESTORETIC) 20-12.5 MG PER TABLET    Take 1 tablet by mouth once daily.    METFORMIN (GLUCOPHAGE) 1000 MG TABLET    Take 1,000 mg by mouth daily with breakfast.    METOPROLOL SUCCINATE (TOPROL-XL) 25 MG 24 HR TABLET    " Take 25 mg by mouth once daily.    OZEMPIC 1 MG/DOSE (4 MG/3 ML)    Inject 1 mg into the skin every 7 days.    PANTOPRAZOLE (PROTONIX) 40 MG TABLET        SOD SULF-POT CHLORIDE-MAG SULF (SUTAB) 1.479-0.188- 0.225 GRAM TABLET    Take according to package instructions with indicated amount of water. No breakfast day before test. May substitute with Suprep, Clenpiq, Plenvu, Moviprep or GoLytely based on Rx plan and patient preference.      Review of patient's allergies indicates:  No Known Allergies   Family History   Problem Relation Name Age of Onset    Kidney cancer Mother          kidney, uterine, and leukemia    Skin cancer Father          skin    Kidney cancer Maternal Grandfather          kidney    Kidney cancer Paternal Grandmother          uterine and kidney      Social History     Tobacco Use    Smoking status: Former     Types: Cigarettes    Tobacco comments:     down to 1 cigarette per morning   Substance Use Topics    Alcohol use: Not Currently    Drug use: Never        Physical Examination:     General Appearance:___________________________  HEENT: _____________________________________  Abdomen:____________________________________  Heart:________________________________________  Lungs:_______________________________________  Extremities:___________________________________  Skin:_________________________________________  Endocrine:____________________________________  Genitourinary:_________________________________  Neurological:__________________________________      Patient has been evaluated immediately prior to sedation and is medically cleared for endoscopy with IVCS as an ASA class: ______      Physician Signature: _________________________       Date: ________  Time: ________

## 2024-10-22 NOTE — TELEPHONE ENCOUNTER
S/w pt and told her that I was calling as a courtesy regarding up coming EGD/Colon with [facility] on 10/29/24, Tuesday and wanted to verify that she has her paper prep instructions and meds.Pt stated she has both. I reminded pt not to take Ozempic 7 days before the procedure. Pt acknowledge that she understood.  I also mentioned that [facility] will call the day before (MON) with the arrival time, GI Lab is located on the third floor, and to pre-register anytime this week. karla

## 2024-10-29 ENCOUNTER — OUTSIDE PLACE OF SERVICE (OUTPATIENT)
Dept: GASTROENTEROLOGY | Facility: CLINIC | Age: 39
End: 2024-10-29

## 2024-10-29 LAB
B-HCG UR QL: NEGATIVE
CRC RECOMMENDATION EXT: NORMAL
SPECIFIC GRAVITY,UA,REFRACTOMETER: 1.02 (ref 1–1.03)

## 2024-11-10 ENCOUNTER — TELEPHONE (OUTPATIENT)
Dept: GASTROENTEROLOGY | Facility: CLINIC | Age: 39
End: 2024-11-10
Payer: OTHER GOVERNMENT

## 2024-11-11 NOTE — TELEPHONE ENCOUNTER
DBx nl, GBx wnl w/o Hp, CBx rare focal acute infl.  Xifaxan did not help. Loperamide does not help.     Notify patient. No signs of infection, precancerous cells or Celiac disease on the upper endoscopy biopsies.  No significant inflammation on the CBx. Did she have her GES? I do not see the report in Epic.  NBP

## 2024-11-11 NOTE — TELEPHONE ENCOUNTER
S/W pt & notified her no signs of infection, precancerous cells or Celiac disease on the upper endoscopy biopsies. Also no significant inflammation on the Cbx. Pt stated she did not have her GES done yet. Pt also stated she is still currently having she same symptoms of diarrhea and would like to know what's causing it and what to do abt it bc she's been to the ER multiple times. -CLAUDE

## 2024-11-15 ENCOUNTER — PATIENT MESSAGE (OUTPATIENT)
Dept: GASTROENTEROLOGY | Facility: CLINIC | Age: 39
End: 2024-11-15
Payer: OTHER GOVERNMENT

## 2024-11-15 NOTE — TELEPHONE ENCOUNTER
Staff messaged pt via my chart informing her that her question on the nxt steps to take regarding her diarrhea and recent ER visits has been forwarded to the providers. Informed pt staff will update her when the providers update us with a answer. -CLAUDE

## 2024-11-15 NOTE — TELEPHONE ENCOUNTER
Pt Advice  Denisa Watts Staff  Caller: self (Yesterday,  8:24 AM)  Patient is requesting a call back regarding asking what is the next step. Please call back at [phone]

## 2024-11-15 NOTE — TELEPHONE ENCOUNTER
When did she start taking Metformin and Ozempic? Does she take any NSAIDs such as Advil, Aleve, ibuprofen, diclofenac, Motrin, BC powder? All can cause GI side effects. She may try pancreatic enzymes over the counter with each meal (any brand) to see if this helps her diarrhea.  MLC

## 2024-11-18 NOTE — TELEPHONE ENCOUNTER
I am not sure what tumor she is referring to. There were no tumors on her upper and lower endoscopies with Dr. Narayanan. Have her try over the counter pancreatic enzymes for diarrhea.  MLC

## 2024-11-20 NOTE — TELEPHONE ENCOUNTER
Dr. Narayanan took biopsies of the duodenal mucosa or lining to check for celiac disease and the biopsy showed normal tissue. No tumor or celiac disease.   MLC   Helical Rim Text: The closure involved the helical rim.

## 2024-11-21 NOTE — TELEPHONE ENCOUNTER
""Abnormal finding on GI tract imaging" was a diagnosis code used from her visit with Sadie. This was referring to the CT she had in 7/2023, which is why Sadie repeated the CT scan in 4/2024.   MLC  "

## 2024-12-05 ENCOUNTER — DOCUMENTATION ONLY (OUTPATIENT)
Dept: GASTROENTEROLOGY | Facility: CLINIC | Age: 39
End: 2024-12-05
Payer: OTHER GOVERNMENT